# Patient Record
Sex: FEMALE
[De-identification: names, ages, dates, MRNs, and addresses within clinical notes are randomized per-mention and may not be internally consistent; named-entity substitution may affect disease eponyms.]

---

## 2017-04-14 ENCOUNTER — HOSPITAL ENCOUNTER (EMERGENCY)
Dept: HOSPITAL 31 - C.ER | Age: 32
Discharge: HOME | End: 2017-04-14
Payer: SELF-PAY

## 2017-04-14 VITALS — RESPIRATION RATE: 18 BRPM | SYSTOLIC BLOOD PRESSURE: 110 MMHG | DIASTOLIC BLOOD PRESSURE: 70 MMHG | HEART RATE: 92 BPM

## 2017-04-14 VITALS — TEMPERATURE: 98.9 F

## 2017-04-14 VITALS — BODY MASS INDEX: 35 KG/M2

## 2017-04-14 VITALS — OXYGEN SATURATION: 100 %

## 2017-04-14 DIAGNOSIS — N76.4: Primary | ICD-10-CM

## 2017-04-14 NOTE — C.PDOC
History Of Present Illness


33 y/o female presents to the ED with complaints of right labial pain and 

swelling x4 days. Pt reports history of multiple bartholin cysts that required I

&D. Pt states pain has become increasingly worse and is difficult to walk. She 

denies fever, trauma, dysuria/hematruia, vaginal bleeding/discharge.


Time Seen by Provider: 04/14/17 15:48


Chief Complaint (Nursing): Female Genitourinary


History Per: Patient


History/Exam Limitations: no limitations


Onset/Duration Of Symptoms: Days


Current Symptoms Are (Timing): Worse


Severity: Moderate


Quality Of Discomfort: "Pain"


Associated Symptoms: denies: Fever, Chills


Alleviating Factors: None


Abnormal Vaginal Bleeding: No





Past Medical History


Reviewed: Historical Data, Nursing Documentation, Vital Signs


Vital Signs: 


 Last Vital Signs











Temp  98.9 F   04/14/17 15:36


 


Pulse  92 H  04/14/17 16:53


 


Resp  18   04/14/17 16:53


 


BP  110/70   04/14/17 16:53


 


Pulse Ox  100   04/14/17 17:19














- Medical History


PMH: HTN, Kidney Stones, Chronic Kidney Disease





- [facility] Procedures








INCIS VULVA/PERINEUM NEC (07/03/14)


INCISE BARTHOLIN'S GLAND (05/27/15)








Family History: States: No Known Family Hx





- Social History


Hx Tobacco Use: No


Hx Alcohol Use: No


Hx Substance Use: No





- Immunization History


Hx Tetanus Toxoid Vaccination: No


Hx Influenza Vaccination: No


Hx Pneumococcal Vaccination: No





Review Of Systems


Except As Marked, All Systems Reviewed And Found Negative.


Constitutional: Negative for: Fever, Chills


Cardiovascular: Negative for: Chest Pain, Palpitations


Respiratory: Negative for: Cough, Shortness of Breath


Gastrointestinal: Negative for: Nausea, Vomiting, Abdominal Pain, Diarrhea


Genitourinary: Positive for: Other (right labial pain and swelling ).  Negative 

for: Dysuria, Hematuria, Vaginal Discharge, Vaginal Bleeding





Physical Exam





- Physical Exam


Appears: Non-toxic, In Acute Distress (mild to moderate)


Skin: Warm, Dry, No Rash


Oral Mucosa: Moist


Cardiovascular: Rhythm Regular


Respiratory: Normal Breath Sounds, No Rales, No Rhonchi, No Wheezing


Gastrointestinal/Abdominal: Normal Exam, Bowel Sounds, Soft, No Tenderness


Pelvic: No Vaginal Bleeding, No Vaginal Discharge, Other (superior aspect of 

right labia - 6 cm area of induration, 2 cm area of fluctuance with central 

pustule, exquisitely tender; no bartholins cyst; no vesicular lesions)


Extremity: Bilateral: Atraumatic


Neurological/Psych: Oriented x3





ED Course And Treatment


O2 Sat by Pulse Oximetry: 100 (on room air)


Pulse Ox Interpretation: Normal


Progress Note: Patient given PO Vicodin and Clindamycin.  I&D done by me, see 

note. Patient tolerated procedure with some difficulty.  Instructed patient to 

return in 48 hours for packing removal and wound check.





- Incision & Drainage Of Abscess


Anesthesia: Lidocaine 1%


Prep Used: Betadine


Procedure: Incised W/Scalpel Blade#: (11), Drained Pus (~1-2mL expressed), 

Packed W/Gauze (1/4 inch iodoform; covered with gauze and dressing), Cultures 

Obtained And Sent To Lab





Disposition


Counseled Patient/Family Regarding: Studies Performed, Diagnosis, Need For 

Followup, Rx Given





- Disposition


Referrals: 


Elio Feliz MD [Staff Provider] - 


Disposition: HOME/ ROUTINE


Disposition Time: 16:50


Condition: STABLE


Additional Instructions: 


DEVUELVA A LA ROBERTO DE EMERGENCIA EN 48 HORAS PARA LA ELIMINACIN DE LAS HERIDAS 

/ EMBALAJE





USE MEDICAMENTOS SEGN LO DIRIGIDO








RETURN TO EMERGENCY ROOM IN 48 HOURS FOR WOUND CHECK/PACKING REMOVAL





USE MEDICATIONS AS DIRECTED


Prescriptions: 


Clindamycin [Cleocin] 300 mg PO TID #21 cap


Hydrocodone/Acetaminophen [Hydrocodon-Acetaminophen 5-325] 1 each PO Q6 PRN #12 

tablet


 PRN Reason: Pain, Moderate (4-7)


Lactobacillus Combination No.4 [Probiotic] 1 each PO DAILY #7 capsule


Instructions:  Abscess (ED)


Print Language: Cymro





- POA


Present On Arrival: None





- Clinical Impression


Clinical Impression: 


 Abscess of labia





- Scribe Statement


The provider has reviewed the documentation as recorded by the Nimisha Pritchett


Provider Attestation: 





All medical record entries made by the Nimisha were at my direction and 

personally dictated by me. I have reviewed the chart and agree that the record 

accurately reflects my personal performance of the history, physical exam, 

medical decision making, and the department course for this patient. I have 

also personally directed, reviewed, and agree with the discharge instructions 

and disposition.

## 2017-04-22 ENCOUNTER — HOSPITAL ENCOUNTER (EMERGENCY)
Dept: HOSPITAL 31 - C.ER | Age: 32
Discharge: HOME | End: 2017-04-22
Payer: SELF-PAY

## 2017-04-22 VITALS
DIASTOLIC BLOOD PRESSURE: 72 MMHG | SYSTOLIC BLOOD PRESSURE: 124 MMHG | OXYGEN SATURATION: 100 % | HEART RATE: 89 BPM | TEMPERATURE: 98.5 F

## 2017-04-22 VITALS — BODY MASS INDEX: 35 KG/M2

## 2017-04-22 VITALS — RESPIRATION RATE: 20 BRPM
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
## 2017-04-22 DIAGNOSIS — S70.362A: Primary | ICD-10-CM

## 2017-04-22 NOTE — C.PDOC
History Of Present Illness


The patient, a 31 y/o female, presents to the ED for evaluation of a pimple to 

her left inner thigh which she noted around 3 days ago. Patient states the 

pimple appears to be getting bigger and is now more painful. Patient denies 

fever, chills, or known allergens. 


Time Seen by Provider: 04/22/17 21:19


Chief Complaint (Nursing): Abnormal Skin Integrity


History Per: Patient


History/Exam Limitations: no limitations


Onset/Duration Of Symptoms: Days (3)


Current Symptoms Are (Timing): Still Present


Location Of Injury: Left: Leg (inner thigh)


Quality Of Symptoms: Painful


Additional History Per: Patient





Past Medical History


Reviewed: Historical Data, Nursing Documentation, Vital Signs


Vital Signs: 


 Last Vital Signs











Temp  98.5 F   04/22/17 21:05


 


Pulse  89   04/22/17 21:05


 


Resp  20   04/22/17 21:58


 


BP  124/72   04/22/17 21:05


 


Pulse Ox  100   04/23/17 04:00














- Medical History


PMH: HTN, Kidney Stones, Chronic Kidney Disease


Surgical History: No Surg Hx





- [facility] Procedures








INCIS VULVA/PERINEUM NEC (07/03/14)


INCISE BARTHOLIN'S GLAND (05/27/15)








Family History: States: Unknown Family Hx





- Social History


Hx Tobacco Use: No


Hx Alcohol Use: No


Hx Substance Use: No





- Immunization History


Hx Tetanus Toxoid Vaccination: No


Hx Influenza Vaccination: Yes


Hx Pneumococcal Vaccination: No





Review Of Systems


Except As Marked, All Systems Reviewed And Found Negative.


Constitutional: Negative for: Fever, Chills


Skin: Positive for: Other (+painful pimple to left inner thigh )





Physical Exam





- Physical Exam


Appears: Non-toxic, No Acute Distress


Skin: Normal Color, Warm, Dry, Other (left upper inner thigh: 3x4 area of 

localized erythema with small papule at the center. no swelling, warmth, or 

purulent mass )


Head: Atraumatic, Normacephalic


Eye(s): bilateral: Normal Inspection


Oral Mucosa: Moist


Neck: Supple


Extremity: Normal ROM, No Tenderness, No Calf Tenderness, Capillary Refill (

less than 2 seconds ), No Deformity, No Swelling


Neurological/Psych: Oriented x3, Normal Speech, Normal Cognition


Gait: Steady





ED Course And Treatment


O2 Sat by Pulse Oximetry: 100 (on RA)


Pulse Ox Interpretation: Normal


Progress Note: On reassessment, patient is resting comfortably, showing no 

signs of distress, and is stable for discharge. Patient is advised to follow up 

with her PMD within a timely manner for further evaluation.





Disposition


Counseled Patient/Family Regarding: Diagnosis, Need For Followup





- Disposition


Referrals: 


[facility] [Outside]


Disposition: HOME/ ROUTINE


Disposition Time: 21:45


Condition: STABLE


Additional Instructions: 


Take meds as directed 





Follow up with PMD 





Return to ER if worse


Prescriptions: 


Sulfamethoxazole/Trimethoprim [Bactrim Ds Tablet] 1 each PO BID #14 tablet


DiphenhydrAMINE [Benadryl] 25 mg PO QID #20 cap


Ibuprofen [Motrin] 600 mg PO Q6H #30 tab


Instructions:  Insect Bite or Sting (ED)


Print Language: Ukrainian





- Clinical Impression


Clinical Impression: 


 Insect bite





- PA / NP / Resident Statement


MD/DO has reviewed & agrees with the documentation as recorded.





- Scribe Statement


The provider has reviewed the documentation as recorded by the Scribe (Lesa Rodriguez)





All medical record entries made by the Scribe were at my direction and 

personally dictated by me. I have reviewed the chart and agree that the record 

accurately reflects my personal performance of the history, physical exam, 

medical decision making, and the department course for this patient. I have 

also personally directed, reviewed, and agree with the discharge instructions 

and disposition.

## 2017-09-15 ENCOUNTER — HOSPITAL ENCOUNTER (EMERGENCY)
Dept: HOSPITAL 31 - C.ER | Age: 32
Discharge: HOME | End: 2017-09-15
Payer: SELF-PAY

## 2017-09-15 VITALS
TEMPERATURE: 98.2 F | HEART RATE: 72 BPM | OXYGEN SATURATION: 100 % | SYSTOLIC BLOOD PRESSURE: 110 MMHG | RESPIRATION RATE: 16 BRPM | DIASTOLIC BLOOD PRESSURE: 69 MMHG

## 2017-09-15 VITALS — BODY MASS INDEX: 32.9 KG/M2

## 2017-09-15 DIAGNOSIS — L02.412: Primary | ICD-10-CM

## 2017-09-15 NOTE — C.PDOC
History Of Present Illness


The patient is a 33yo female, presents to the ED for evaluation of swelling and 

painful lump to her left axilla, present for the past 3 months. She reports a 

history of abscesses in the past but states she has not had one in this area. 

She reports associated tactile fever but offers no other medical complaints.


Time Seen by Provider: 09/15/17 19:18


Chief Complaint (Nursing): Abnormal Skin Integrity


History Per: Patient


History/Exam Limitations: no limitations


Onset/Duration Of Symptoms: Days (3)


Current Symptoms Are (Timing): Still Present


Quality Of Symptoms: Swollen


Additional History Per: Patient





Past Medical History


Reviewed: Historical Data, Nursing Documentation, Vital Signs


Vital Signs: 


 Last Vital Signs











Temp  98.2 F   09/15/17 18:32


 


Pulse  72   09/15/17 18:32


 


Resp  16   09/15/17 18:32


 


BP  110/69   09/15/17 18:32


 


Pulse Ox  100   09/15/17 21:28














- Medical History


PMH: HTN, Kidney Stones, Chronic Kidney Disease


Surgical History: 





- [facility] Procedures








INCIS VULVA/PERINEUM NEC (14)


INCISE BARTHOLIN'S GLAND (05/27/15)








Family History: States: No Known Family Hx, Unknown Family Hx





- Social History


Hx Tobacco Use: No


Hx Alcohol Use: No


Hx Substance Use: No





- Immunization History


Hx Tetanus Toxoid Vaccination: Yes


Hx Influenza Vaccination: Yes


Hx Pneumococcal Vaccination: Yes





Review Of Systems


Constitutional: Positive for: Fever (tactile)


Musculoskeletal: Positive for: Other (swollen and painful lump present on left 

axilla)





Physical Exam





- Physical Exam


Appears: Non-toxic, No Acute Distress


Skin: Warm, Dry, No Rash


Head: Atraumatic, Normacephalic


Eye(s): bilateral: Normal Inspection


Oral Mucosa: Moist


Neck: Normal, Supple


Cardiovascular: Rhythm Regular, No Friction Rub, No Murmur


Respiratory: Normal Breath Sounds, No Decreased Breath Sounds, No Accessory 

Muscle Use


Extremity: Normal ROM, Swelling (2x2cm area of swelling, redness and fluctuance 

present on left axilla.)


Neurological/Psych: Oriented x3, Normal Speech, Normal Cognition, Normal Motor, 

Normal Sensation


Gait: Steady





ED Course And Treatment


O2 Sat by Pulse Oximetry: 100 (RA)


Pulse Ox Interpretation: Normal


Progress Note: Doxycycline and Motrin ordered.





- Incision & Drainage Of Abscess


Anesthesia: Lidocaine 1%, With Epi


Prep Used: Sterile Water, Betadine


Procedure: Incised W/Scalpel Blade#: (11), Drained Pus, Probed To Break Up 

Loculations, Packed W/Gauze, Cultures Obtained And Sent To Lab





Disposition





- Disposition


Referrals: 


[facility] [Outside]


Disposition: HOME/ ROUTINE


Disposition Time: 20:25


Condition: GOOD


Additional Instructions: 


Follow up with the medical doctor within 1-2 days. Return if worsened. 


Prescriptions: 


Doxycycline Hyclate [Doryx] 100 mg PO BID #19 cap


Ibuprofen [Motrin Tab] 800 mg PO TID #20 tab


Instructions:  Abscess (ED)


Forms:  [facility] (English)


Print Language: Portuguese





- Clinical Impression


Clinical Impression: 


 Abscess








- PA / NP / Resident Statement


MD/DO has reviewed & agrees with the documentation as recorded.





- Scribe Statement


The provider has reviewed the documentation as recorded by the Scribe


Shaye Triana





All medical record entries made by the Scribe were at my direction and 

personally dictated by me. I have reviewed the chart and agree that the record 

accurately reflects my personal performance of the history, physical exam, 

medical decision making, and the department course for this patient. I have 

also personally directed, reviewed, and agree with the discharge instructions 

and disposition.

## 2017-11-10 ENCOUNTER — HOSPITAL ENCOUNTER (EMERGENCY)
Dept: HOSPITAL 31 - C.ER | Age: 32
Discharge: HOME | End: 2017-11-10
Payer: COMMERCIAL

## 2017-11-10 VITALS — DIASTOLIC BLOOD PRESSURE: 68 MMHG | TEMPERATURE: 98.1 F | HEART RATE: 69 BPM | SYSTOLIC BLOOD PRESSURE: 119 MMHG

## 2017-11-10 VITALS — OXYGEN SATURATION: 98 %

## 2017-11-10 VITALS — RESPIRATION RATE: 18 BRPM

## 2017-11-10 VITALS — BODY MASS INDEX: 32.9 KG/M2

## 2017-11-10 DIAGNOSIS — N75.1: Primary | ICD-10-CM

## 2017-11-10 NOTE — C.PDOC
History Of Present Illness


33 yo female c/o left sided vaginal swelling for 3 day. Notes h/o bartholins 

cysts with multiple I&Ds. Pain worse with movement. Notes she tried draining it 

herself without releif. No fever, abdominal pain, vaginal discharge, trauma, 

dysuria/hematruia. Pt is currently menstruating. 


Time Seen by Provider: 11/10/17 16:38


Chief Complaint (Nursing): Abnormal Skin Integrity


History Per: Patient, 


History/Exam Limitations: no limitations


Onset/Duration Of Symptoms: Days


Current Symptoms Are (Timing): Still Present





Past Medical History


Vital Signs: 


 Last Vital Signs











Temp  98.1 F   11/10/17 18:03


 


Pulse  69   11/10/17 18:03


 


Resp  18   11/10/17 18:03


 


BP  119/68   11/10/17 18:03


 


Pulse Ox  98   11/10/17 20:33














- Medical History


PMH: HTN, Kidney Stones, Chronic Kidney Disease


Surgical History: 





- [facility] Procedures








INCIS VULVA/PERINEUM NEC (14)


INCISE BARTHOLIN'S GLAND (05/27/15)








Family History: States: Unknown Family Hx





- Social History


Hx Tobacco Use: No


Hx Alcohol Use: No


Hx Substance Use: No





- Immunization History


Hx Tetanus Toxoid Vaccination: Yes


Hx Influenza Vaccination: Yes


Hx Pneumococcal Vaccination: Yes





Review Of Systems


Constitutional: Negative for: Fever


Genitourinary: Negative for: Dysuria, Vaginal Discharge, Pelvic Pain





Physical Exam





- Physical Exam


Appears: Well, Non-toxic, No Acute Distress


Skin: Normal Color, Warm, Dry


Head: Atraumatic, Normacephalic


Eye(s): bilateral: Normal Inspection, EOMI


Nose: Normal


Throat: Normal


Neck: Normal


Chest: Symmetrical


Respiratory: No Accessory Muscle Use


Gastrointestinal/Abdominal: Normal Exam, Soft, No Tenderness


Back: Normal Inspection


Pelvic: Other ((+) tenderness, swelling, fluctuance and minimal active 

discharge to left inferior labia)


Extremity: Normal ROM


Neurological/Psych: Oriented x3, Normal Speech





ED Course And Treatment


O2 Sat by Pulse Oximetry: 98


Progress Note: Pt tolerated procedure.   used to ensure 

understanding.  Discussed wound care and instructed GYN follow up with PM Din 1-

2 days.





- Incision & Drainage Of Abscess


Anesthesia: Lidocaine 2%


Prep Used: Sterile Water, Betadine


Procedure: Incised W/Scalpel Blade#: (11), Drained Pus (copious amount of 

drainage ), Irrigated Cavity W/Saline, Probed To Break Up Loculations, Packed W/

Gauze





Disposition





- Disposition


Referrals: 


[facility] [Outside]


Disposition: HOME/ ROUTINE


Disposition Time: 17:46


Condition: STABLE


Additional Instructions: 


Return in 2 days for wound check. 


Regrese en 2 colón para el control de la herida. [place] los medicamentos jm 

indicado. Volver a la james de emergencia en cualquier momento si los sntomas 

persisten o empeoran.


Prescriptions: 


Clindamycin [Cleocin] 300 mg PO Q6 #28 cap


oxyCODONE/Acetaminophen [Percocet 5/325 mg Tab] 1 tab PO QID PRN #20 tab


 PRN Reason: Pain


Instructions:  Bartholin Cyst (ED)


Forms:  [facility] (English)





- Clinical Impression


Clinical Impression: 


 Abscess of Bartholin's gland, Incisional abscess

## 2018-03-13 ENCOUNTER — HOSPITAL ENCOUNTER (EMERGENCY)
Dept: HOSPITAL 31 - C.ER | Age: 33
Discharge: HOME | End: 2018-03-13
Payer: COMMERCIAL

## 2018-03-13 VITALS
RESPIRATION RATE: 18 BRPM | SYSTOLIC BLOOD PRESSURE: 107 MMHG | DIASTOLIC BLOOD PRESSURE: 74 MMHG | TEMPERATURE: 98.3 F | HEART RATE: 87 BPM | OXYGEN SATURATION: 100 %

## 2018-03-13 VITALS — BODY MASS INDEX: 32.9 KG/M2

## 2018-03-13 DIAGNOSIS — I12.9: ICD-10-CM

## 2018-03-13 DIAGNOSIS — M89.8X9: ICD-10-CM

## 2018-03-13 DIAGNOSIS — R51: Primary | ICD-10-CM

## 2018-03-13 DIAGNOSIS — N18.9: ICD-10-CM

## 2018-03-13 NOTE — C.PDOC
History Of Present Illness


33 year old female presents to the ED complaining of a left-sided headache 

associated with nasal congestion for 2 days. Patient reports history of the 

similar episodes of headaches in the past. Also complaining of a painless bony 

prominence at her forehead, which she states has been there for years. Patient 

states she was referred here to have surgical removal of this bony prominence 

at our hospital. Has not taken any medications for symptom relief. 





Time Seen by Provider: 18 16:35


Chief Complaint (Nursing): Headache


History Per: Patient


History/Exam Limitations: no limitations


Onset/Duration Of Symptoms: Intermittent Episodes


Current Symptoms Are (Timing): Still Present





Past Medical History


Reviewed: Historical Data, Nursing Documentation, Vital Signs


Vital Signs: 


 Last Vital Signs











Temp  98.3 F   18 16:28


 


Pulse  87   18 16:28


 


Resp  18   18 16:28


 


BP  107/74   18 16:28


 


Pulse Ox  100   18 17:05














- Medical History


PMH: HTN, Kidney Stones, Chronic Kidney Disease


Surgical History: 





- [facility] Procedures








INCIS VULVA/PERINEUM NEC (14)


INCISE BARTHOLIN'S GLAND (05/27/15)








Family History: States: Unknown Family Hx





- Social History


Hx Tobacco Use: No


Hx Alcohol Use: No


Hx Substance Use: No





- Immunization History


Hx Tetanus Toxoid Vaccination: Yes


Hx Influenza Vaccination: Yes


Hx Pneumococcal Vaccination: Yes





Review Of Systems


Except As Marked, All Systems Reviewed And Found Negative.


Eyes: Negative for: Vision Change


ENT: Positive for: Nose Congestion


Neurological: Positive for: Headache (with bony prominence to forehead).  

Negative for: Weakness, Numbness, Incoordination, Change in Speech





Physical Exam





- Physical Exam


Appears: Non-toxic, No Acute Distress


Skin: Normal Color, Warm, Dry


Head: Atraumatic, Normacephalic, Other (1x1 superficial bony prominence at 

right center of forehead)


Eye(s): bilateral: Normal Inspection, PERRL, EOMI


Ear(s): Bilateral: Normal


Nose: Other (Nasal passages inflamed bilaterally, turbinates touching on the 

left side)


Oral Mucosa: Moist


Neck: Normal ROM, Supple


Chest: Symmetrical


Cardiovascular: Rhythm Regular, No Murmur


Respiratory: Normal Breath Sounds, No Accessory Muscle Use


Neurological/Psych: Oriented x3, Normal Speech, Normal Cranial Nerves, Normal 

Motor, Normal Sensation


Gait: Steady





ED Course And Treatment


O2 Sat by Pulse Oximetry: 100 (RA)


Pulse Ox Interpretation: Normal





Medical Decision Making


Medical Decision Making: 





Impression:


small superficial bony painless SQ are under L frontal area 1x1 cm.  Pt claims 

this gives her headaches chronically and is pending elective surgical resection 

but no CT of head/face noted here, no notes mentioning this forehead finding.


bony prominence is painless and LOW susp of causing chronic headaches, where 

sinus headaches much more likely in this case. 





May be referred for oupatient MRI from Clinic for further eval of seemingly 

benign forehead bony prominence.





Disposition


Doctor Will See Patient In The: Office


Counseled Patient/Family Regarding: Studies Performed, Diagnosis





- Disposition


Referrals: 


[facility] [Outside]


[email or web address] [Outside]


Disposition: HOME/ ROUTINE


Disposition Time: 17:02


Condition: GOOD


Additional Instructions: 


sigue con DAyquil o' Nyquil "COLD AND SINUS" (o' el equivalente) que contiene 

el ingrediente para descongestionar las pasajes nasales





[place] Flonase (esteroids por la nariz) que baja inflammaci'n nasal tambien.





Sigue en la [facility] para considerar un MRI del area frontal 

de la neal para evaluar betsy prominencia del hueso frontal.


Prescriptions: 


Fluticasone Nasal [Flonase] 1 actuation NS DAILY #1 spr


Instructions:  Sinus Headache (DC)


Forms:  [facility] (English)


Print Language: Amharic





- POA


Present On Arrival: None





- Clinical Impression


Clinical Impression: 


 Sinus headache, Bony prominence








- Scribe Statement


The provider has reviewed the documentation as recorded by the Scribe


Ina Mclean





Provider Attestation: 





All medical record entries made by the Scribe were at my direction and 

personally dictated by me. I have reviewed the chart and agree that the record 

accurately reflects my personal performance of the history, physical exam, 

medical decision making, and the department course for this patient. I have 

also personally directed, reviewed, and agree with the discharge instructions 

and disposition.

## 2018-03-20 ENCOUNTER — HOSPITAL ENCOUNTER (EMERGENCY)
Dept: HOSPITAL 31 - C.ER | Age: 33
Discharge: HOME | End: 2018-03-20
Payer: COMMERCIAL

## 2018-03-20 VITALS — RESPIRATION RATE: 18 BRPM | HEART RATE: 76 BPM | DIASTOLIC BLOOD PRESSURE: 68 MMHG | SYSTOLIC BLOOD PRESSURE: 105 MMHG

## 2018-03-20 VITALS — OXYGEN SATURATION: 100 %

## 2018-03-20 VITALS — BODY MASS INDEX: 30.2 KG/M2

## 2018-03-20 VITALS — TEMPERATURE: 98.7 F

## 2018-03-20 DIAGNOSIS — L02.214: Primary | ICD-10-CM

## 2018-03-20 NOTE — C.PDOC
History Of Present Illness


32yo female, presents to ED with complaints of pain and swelling to her right 

groin area for the past 4 days. Patient states 6 days ago, she had waxing to 

the area and for the past 4 days, she has noticed painful swelling to that 

area. She reports a history of multiple abscesses with incision and drainage in 

the past in various locations. She currently denies any fever or chills. 


Time Seen by Provider: 18 16:08


Chief Complaint (Nursing): Abnormal Skin Integrity


History Per: Patient


History/Exam Limitations: no limitations


Onset/Duration Of Symptoms: Days


Current Symptoms Are (Timing): Still Present


Quality Of Symptoms: Painful, Swollen


Additional History Per: Patient





Past Medical History


Reviewed: Historical Data, Nursing Documentation, Vital Signs


Vital Signs: 


 Last Vital Signs











Temp  98.7 F   18 15:56


 


Pulse  76   18 17:04


 


Resp  18   18 17:04


 


BP  105/68   18 17:04


 


Pulse Ox  100   18 18:13














- Medical History


PMH: HTN, Kidney Stones, Chronic Kidney Disease


Surgical History: 





- [facility] Procedures








INCIS VULVA/PERINEUM NEC (14)


INCISE BARTHOLIN'S GLAND (05/27/15)








Family History: States: Unknown Family Hx





- Social History


Hx Tobacco Use: No


Hx Alcohol Use: No


Hx Substance Use: No





- Immunization History


Hx Tetanus Toxoid Vaccination: Yes


Hx Influenza Vaccination: Yes


Hx Pneumococcal Vaccination: Yes





Review Of Systems


Except As Marked, All Systems Reviewed And Found Negative.


Constitutional: Negative for: Fever, Chills


Skin: Positive for: Other (painful swelling to right groin region)





Physical Exam





- Physical Exam


Appears: Non-toxic, No Acute Distress


Skin: Warm, Dry


Head: Atraumatic, Normacephalic


Eye(s): bilateral: Normal Inspection, EOMI


Nose: Normal


Oral Mucosa: Moist


Neck: Normal ROM, Supple


Chest: Symmetrical


Respiratory: No Accessory Muscle Use


Gastrointestinal/Abdominal: Soft, No Tenderness


Pelvic: Other (mons pubis: 1cm area of swelling, tenderness and erythema with 

central fluctuance to right side)


Extremity: Normal ROM, Other


Neurological/Psych: Oriented x3





ED Course And Treatment


O2 Sat by Pulse Oximetry: 100 (RA)


Pulse Ox Interpretation: Normal


Progress Note: Incision and drainage performed by provider and wound packing 

was placed. Patient given instructions for wound care and instructed to follow 

up without fail.  used to ensure understanding.





- Incision & Drainage Of Abscess


Anesthesia: Lidocaine 1%


Used During Procedure: Continuous Pulse Oximetry


Prep Used: Sterile Water, Betadine


Procedure: Incised W/Scalpel Blade#: (11), Drained Pus, Irrigated Cavity W/

Saline, Probed To Break Up Loculations, Packed W/Gauze





Disposition





- Disposition


Disposition: HOME/ ROUTINE


Disposition Time: 16:57


Condition: STABLE


Additional Instructions: 


Aplique compresa caliente. [place] de heridas en 2 colón. Vaya a schroeder mdico o la 

clnica en 2-5 colón sin falta, para mas evaluacin. [place] los medicamentos jm 

indicado. Volver a la james de emergencia en cualquier momento si los sntomas 

persisten o empeoran.


Prescriptions: 


Clindamycin [Cleocin] 300 mg PO Q6 #28 cap


Instructions:  Abscess Incision and Drainage


Forms:  [facility] (English)


Print Language: Nepali





- Clinical Impression


Clinical Impression: 


 Incisional abscess








- PA / NP / Resident Statement


MD/DO has reviewed & agrees with the documentation as recorded.





- Scribe Statement


The provider has reviewed the documentation as recorded by the Scribe (Shaye Triana)


Provider Attestation:


All medical record entries made by the Scribe were at my direction and 

personally dictated by me. I have reviewed the chart and agree that the record 

accurately reflects my personal performance of the history, physical exam, 

medical decision making, and the department course for this patient. I have 

also personally directed, reviewed, and agree with the discharge instructions 

and disposition.

## 2018-04-19 ENCOUNTER — HOSPITAL ENCOUNTER (EMERGENCY)
Dept: HOSPITAL 31 - C.ER | Age: 33
Discharge: HOME | End: 2018-04-19
Payer: COMMERCIAL

## 2018-04-19 VITALS
TEMPERATURE: 97.9 F | HEART RATE: 74 BPM | DIASTOLIC BLOOD PRESSURE: 71 MMHG | RESPIRATION RATE: 20 BRPM | SYSTOLIC BLOOD PRESSURE: 130 MMHG

## 2018-04-19 VITALS — OXYGEN SATURATION: 100 %

## 2018-04-19 VITALS — BODY MASS INDEX: 30.2 KG/M2

## 2018-04-19 DIAGNOSIS — N92.6: Primary | ICD-10-CM

## 2018-04-19 DIAGNOSIS — I12.9: ICD-10-CM

## 2018-04-19 DIAGNOSIS — N18.9: ICD-10-CM

## 2018-04-19 LAB
ALBUMIN SERPL-MCNC: 4.4 G/DL (ref 3.5–5)
ALBUMIN/GLOB SERPL: 1.4 {RATIO} (ref 1–2.1)
ALT SERPL-CCNC: 17 U/L (ref 9–52)
AST SERPL-CCNC: 17 U/L (ref 14–36)
BASOPHILS # BLD AUTO: 0.1 K/UL (ref 0–0.2)
BASOPHILS NFR BLD: 1.1 % (ref 0–2)
BUN SERPL-MCNC: 10 MG/DL (ref 7–17)
CALCIUM SERPL-MCNC: 9.3 MG/DL (ref 8.6–10.4)
EOSINOPHIL # BLD AUTO: 0.2 K/UL (ref 0–0.7)
EOSINOPHIL NFR BLD: 2.2 % (ref 0–4)
ERYTHROCYTE [DISTWIDTH] IN BLOOD BY AUTOMATED COUNT: 14.1 % (ref 11.5–14.5)
GFR NON-AFRICAN AMERICAN: > 60
HGB BLD-MCNC: 12.1 G/DL (ref 11–16)
LYMPHOCYTES # BLD AUTO: 2.8 K/UL (ref 1–4.3)
LYMPHOCYTES NFR BLD AUTO: 33.8 % (ref 20–40)
MCH RBC QN AUTO: 26.9 PG (ref 27–31)
MCHC RBC AUTO-ENTMCNC: 32.5 G/DL (ref 33–37)
MCV RBC AUTO: 82.6 FL (ref 81–99)
MONOCYTES # BLD: 0.5 K/UL (ref 0–0.8)
MONOCYTES NFR BLD: 6.1 % (ref 0–10)
NEUTROPHILS # BLD: 4.8 K/UL (ref 1.8–7)
NEUTROPHILS NFR BLD AUTO: 56.8 % (ref 50–75)
NRBC BLD AUTO-RTO: 0.1 % (ref 0–2)
PLATELET # BLD: 315 K/UL (ref 130–400)
PMV BLD AUTO: 8.4 FL (ref 7.2–11.7)
RBC # BLD AUTO: 4.52 MIL/UL (ref 3.8–5.2)
WBC # BLD AUTO: 8.4 K/UL (ref 4.8–10.8)

## 2018-04-19 PROCEDURE — 99283 EMERGENCY DEPT VISIT LOW MDM: CPT

## 2018-04-19 PROCEDURE — 84702 CHORIONIC GONADOTROPIN TEST: CPT

## 2018-04-19 PROCEDURE — 96360 HYDRATION IV INFUSION INIT: CPT

## 2018-04-19 PROCEDURE — 85025 COMPLETE CBC W/AUTO DIFF WBC: CPT

## 2018-04-19 PROCEDURE — 86850 RBC ANTIBODY SCREEN: CPT

## 2018-04-19 PROCEDURE — 80053 COMPREHEN METABOLIC PANEL: CPT

## 2018-04-19 PROCEDURE — 86900 BLOOD TYPING SEROLOGIC ABO: CPT

## 2018-04-19 NOTE — C.PDOC
History Of Present Illness


32 y/o female presents to ED with c/o nausea, vomiting and lightheadedness for 

1 week associated with pelvic cramping pain. Patient states her last menses was 

on 3/20/18 and has been intermittently spotting since. Patient has not taken 

pregnancy test and denies fever, dysruia or any other complaints at this time.


Time Seen by Provider: 18 16:09


Chief Complaint (Nursing): Abdominal Pain


History Per: Patient


History/Exam Limitations: no limitations


Onset/Duration Of Symptoms: Days


Current Symptoms Are (Timing): Still Present


Quality Of Discomfort: Cramping





Past Medical History


Reviewed: Historical Data, Nursing Documentation, Vital Signs


Vital Signs: 


 Last Vital Signs











Temp  98.0 F   18 15:43


 


Pulse  80   18 15:43


 


Resp  19   18 15:43


 


BP  136/78   18 15:43


 


Pulse Ox  100   18 17:30














- Medical History


PMH: HTN, Kidney Stones, Chronic Kidney Disease


Surgical History: 





- [facility] Procedures








INCIS VULVA/PERINEUM NEC (14)


INCISE BARTHOLIN'S GLAND (05/27/15)








Family History: States: No Known Family Hx





- Social History


Hx Tobacco Use: No


Hx Alcohol Use: No


Hx Substance Use: No





- Immunization History


Hx Tetanus Toxoid Vaccination: Yes


Hx Influenza Vaccination: Yes


Hx Pneumococcal Vaccination: No





Review Of Systems


Except As Marked, All Systems Reviewed And Found Negative.


Constitutional: Negative for: Fever, Chills


Cardiovascular: Positive for: Light Headedness


Gastrointestinal: Positive for: Nausea, Vomiting


Genitourinary: Positive for: Vaginal Bleeding, Pelvic Pain.  Negative for: 

Dysuria


Skin: Negative for: Rash





Physical Exam





- Physical Exam


Appears: Non-toxic, No Acute Distress


Skin: Warm, Dry, No Rash


Head: Atraumatic, Normacephalic


Eye(s): bilateral: Normal Inspection


Oral Mucosa: Moist


Neck: Normal ROM, Supple


Cardiovascular: Rhythm Regular


Respiratory: Normal Breath Sounds, No Rales, No Rhonchi, No Wheezing


Gastrointestinal/Abdominal: Soft, No Tenderness, No Guarding, No Rebound


Back: No CVA Tenderness


Extremity: Normal ROM, Capillary Refill (<2 seconds)


Neurological/Psych: Oriented x3, Normal Speech, Normal Cognition





ED Course And Treatment





- Laboratory Results


Result Diagrams: 


 18 16:40





 18 16:40


O2 Sat by Pulse Oximetry: 100 (RA)


Pulse Ox Interpretation: Normal


Progress Note: UA, HCG ordered. IV fluids administered





Disposition


Counseled Patient/Family Regarding: Studies Performed, Diagnosis, Need For 

Followup, Rx Given





- Disposition


Referrals: 


[facility] [Outside]


[facility] [Outside]


[facility] [Outside]


Disposition: HOME/ ROUTINE


Disposition Time: 16:40


Condition: STABLE


Additional Instructions: 


FOLLOW UP WITH OB/GYN WITHIN 1 WEEK





RETURN TO EMERGENCY ROOM IF SYMPTOMS WORSEN  








SEGUIMIENTO CON OB / GYN DENTRO DE 1 SEMANA





REGRESE AL ROBERTO DE EMERGENCIA SI LOS SNTOMAS EMPEORAN


Instructions:  Absent or Irregular Periods


Forms:  [employer] (English)


Print Language: Icelandic





- POA


Present On Arrival: None





- Clinical Impression


Clinical Impression: 


 Irregular menses








- Scribe Statement


The provider has reviewed the documentation as recorded by the Scribsarita Stewart 





All medical record entries made by the Chapinibsarita were at my direction and 

personally dictated by me. I have reviewed the chart and agree that the record 

accurately reflects my personal performance of the history, physical exam, 

medical decision making, and the department course for this patient. I have 

also personally directed, reviewed, and agree with the discharge instructions 

and disposition.

## 2018-09-12 ENCOUNTER — HOSPITAL ENCOUNTER (EMERGENCY)
Dept: HOSPITAL 31 - C.ER | Age: 33
Discharge: HOME | End: 2018-09-12
Payer: COMMERCIAL

## 2018-09-12 VITALS — SYSTOLIC BLOOD PRESSURE: 121 MMHG | DIASTOLIC BLOOD PRESSURE: 76 MMHG | HEART RATE: 83 BPM | TEMPERATURE: 98.2 F

## 2018-09-12 VITALS — RESPIRATION RATE: 18 BRPM | OXYGEN SATURATION: 100 %

## 2018-09-12 VITALS — BODY MASS INDEX: 30.2 KG/M2

## 2018-09-12 DIAGNOSIS — M54.5: Primary | ICD-10-CM

## 2018-09-12 LAB
BACTERIA #/AREA URNS HPF: (no result) /[HPF]
BILIRUB UR-MCNC: NEGATIVE MG/DL
GLUCOSE UR STRIP-MCNC: NORMAL MG/DL
HCG,QUALITATIVE URINE: NEGATIVE
LEUKOCYTE ESTERASE UR-ACNC: (no result) LEU/UL
PH UR STRIP: 5 [PH] (ref 5–8)
PROT UR STRIP-MCNC: NEGATIVE MG/DL
RBC # UR STRIP: NEGATIVE /UL
SP GR UR STRIP: 1.02 (ref 1–1.03)
SQUAMOUS EPITHIAL: 31 /HPF (ref 0–5)
UROBILINOGEN UR-MCNC: NORMAL MG/DL (ref 0.2–1)

## 2018-09-12 NOTE — RAD
Date of service: 



09/12/2018



PROCEDURE:  Radiographs of the Lumbar Spine.



HISTORY:

back pain for one month



COMPARISON:

No prior.



FINDINGS:



BONES:

Alignment appears satisfactory. No listhesis. No acute displaced 

fracture identified. Sclerosis of the pubic symphysis may represent 

osteitis pubis. 



DISC SPACES:

Unremarkable.



OTHER FINDINGS:

Moderate constipation.



IMPRESSION:

No acute osseous abnormality is detected. 



Sclerosis of the pubic symphysis may represent osteitis pubis. 



Moderate constipation.

## 2018-09-12 NOTE — C.PDOC
History Of Present Illness


33-year-old female presents to the ED for evaluation of lower back pain which 

began one month ago. Patient describes her pain as dull and aching and states 

it is non-radiating. She notes pain is worse with standing. She has been taking 

Advil with transient relief. Otherwise, patient denies fever, chills, urinary/

bowel incontinence, dysuria, extremity numbness/weakness, or recent trauma/

injuries. 


Time Seen by Provider: 18 12:58


Chief Complaint (Nursing): Back Pain


History Per: Patient


History/Exam Limitations: no limitations


Onset/Duration Of Symptoms: Other (one month )


Current Symptoms Are (Timing): Still Present


Quality Of Discomfort: Dull, Aching, "Pain"


Previous Symptoms: Back Pain (lower)


Associated Symptoms: denies: Incontinence, New Weakness, New Numbness


Exacerbating Factor(s): Movement


Additional History Per: Patient





Past Medical History


Reviewed: Historical Data, Nursing Documentation, Vital Signs


Vital Signs: 


 Last Vital Signs











Temp  98.2 F   18 14:27


 


Pulse  83   18 14:27


 


Resp  18   18 14:27


 


BP  121/76   18 14:27


 


Pulse Ox  100   18 16:13














- Medical History


PMH: HTN, Kidney Stones, Chronic Kidney Disease


Surgical History: 





- [facility] Procedures








INCIS VULVA/PERINEUM NEC (14)


INCISE BARTHOLIN'S GLAND (05/27/15)








Family History: States: Unknown Family Hx





- Social History


Hx Tobacco Use: No


Hx Alcohol Use: No


Hx Substance Use: No





- Immunization History


Hx Tetanus Toxoid Vaccination: Yes


Hx Influenza Vaccination: Yes


Hx Pneumococcal Vaccination: No





Review Of Systems


Constitutional: Negative for: Fever, Chills


Genitourinary: Negative for: Dysuria, Incontinence


Musculoskeletal: Positive for: Back Pain (lower).  Negative for: Leg Pain


Neurological: Negative for: Weakness, Numbness





Physical Exam





- Physical Exam


Appears: Non-toxic, No Acute Distress


Skin: Normal Color, Warm, Dry, No Rash, No Ecchymosis


Head: Atraumatic, Normacephalic


Eye(s): bilateral: Normal Inspection


Oral Mucosa: Moist


Neck: Supple


Chest: Symmetrical, No Deformity, No Tenderness


Cardiovascular: Rhythm Regular


Respiratory: Normal Breath Sounds


Back: No Vertebral Tenderness, No Paraspinal Tenderness (minimal, lumbar )


Extremity: Normal ROM, No Tenderness, Capillary Refill (less than 2 seconds ), 

No Deformity, No Swelling


Neurological/Psych: Oriented x3, Normal Speech, Normal Cognition


Gait: Steady





ED Course And Treatment


O2 Sat by Pulse Oximetry: 100 (on RA)


Pulse Ox Interpretation: Normal





- Other Rad


  ** lumbar spine XR


X-Ray: Viewed By Me, Read By Radiologist


Interpretation: PROCEDURE:  Radiographs of the Lumbar Spine.  HISTORY:  back 

pain for one month.  COMPARISON:  No prior.  FINDINGS:  BONES:  Alignment 

appears satisfactory. No listhesis. No acute displaced fracture identified. 

Sclerosis of the pubic symphysis may represent osteitis pubis.  DISC SPACES:  

Unremarkable.  OTHER FINDINGS:  Moderate constipation.  IMPRESSION:  No acute 

osseous abnormality is detected.  Sclerosis of the pubic symphysis may 

represent osteitis pubis.  Moderate constipation.





Medical Decision Making


Medical Decision Making: 





Impression: 33 year old female with lower back pain 


Plan: 


* LS Spine AP/LAT 


* Urinalysis 





Progress: 


LS Spine AP/LAT and urinalysis ordered and reviewed. Results show no UTI and 

xray shows no osseous deformity


Patient remained afebrile alert and oriented with stable vital signs during ER 

evaluation.


Discussed results with patient and given follow up instructions. Recommend rest

, heat, and analgesics. Instructed to return to ER if symptoms worsen or new 

symptoms arise.





Disposition


Counseled Patient/Family Regarding: Diagnosis, Need For Followup, Rx Given





- Disposition


Referrals: 


[facility] [Outside]


[facility]


Disposition: HOME/ ROUTINE


Disposition Time: 14:15


Condition: STABLE


Additional Instructions: 


Apply heat to area 15 minutes three times a day. Take Motrin as needed for pain 

every 6 hours, with food to not upset stomach. Take Flexeril for muscle pain 

and spasm, caution can cause drowsiness. Follow up with clinic if pain persists 

over one week





Aplique calor al jl 15 minutos agustin veces al da. [place] Motrin cuando sea 

necesario para el dolor cada 6 horas, con alimentos para no alterar el est

ramesh. [place] Flexeril para el dolor y el espasmo muscular, la precaucin puede 

causar somnolencia. Seguimiento con clinica si el dolor persiste theo torsten 

semana


Prescriptions: 


Cyclobenzaprine [Cyclobenzaprine HCl] 10 mg PO TID #30 tab


Ibuprofen [Motrin] 600 mg PO Q8 #30 tab


Instructions:  Low Back Pain  (DC)


Print Language: Romanian





- POA


Present On Arrival: None





- Clinical Impression


Clinical Impression: 


 Low back pain








- PA / NP / Resident Statement


MD/DO has reviewed & agrees with the documentation as recorded.





- Scribe Statement


The provider has reviewed the documentation as recorded by the Scribe (Lesa Rodriguez)








All medical record entries made by the Scribe were at my direction and 

personally dictated by me. I have reviewed the chart and agree that the record 

accurately reflects my personal performance of the history, physical exam, 

medical decision making, and the department course for this patient. I have 

also personally directed, reviewed, and agree with the discharge instructions 

and disposition.

## 2018-12-07 ENCOUNTER — HOSPITAL ENCOUNTER (EMERGENCY)
Dept: HOSPITAL 31 - C.ER | Age: 33
Discharge: HOME | End: 2018-12-07
Payer: MEDICAID

## 2018-12-07 VITALS — OXYGEN SATURATION: 99 %

## 2018-12-07 VITALS — HEART RATE: 115 BPM | SYSTOLIC BLOOD PRESSURE: 133 MMHG | TEMPERATURE: 98.5 F | DIASTOLIC BLOOD PRESSURE: 77 MMHG

## 2018-12-07 VITALS — RESPIRATION RATE: 16 BRPM

## 2018-12-07 VITALS — BODY MASS INDEX: 30.2 KG/M2

## 2018-12-07 DIAGNOSIS — O02.0: Primary | ICD-10-CM

## 2018-12-07 LAB
ALBUMIN SERPL-MCNC: 4.5 G/DL (ref 3.5–5)
ALBUMIN/GLOB SERPL: 1.5 {RATIO} (ref 1–2.1)
ALT SERPL-CCNC: 18 U/L (ref 9–52)
AST SERPL-CCNC: 18 U/L (ref 14–36)
BASOPHILS # BLD AUTO: 0.1 K/UL (ref 0–0.2)
BASOPHILS NFR BLD: 0.7 % (ref 0–2)
BILIRUB UR-MCNC: NEGATIVE MG/DL
BUN SERPL-MCNC: 10 MG/DL (ref 7–17)
CALCIUM SERPL-MCNC: 9.3 MG/DL (ref 8.6–10.4)
EOSINOPHIL # BLD AUTO: 0 K/UL (ref 0–0.7)
EOSINOPHIL NFR BLD: 0.1 % (ref 0–4)
ERYTHROCYTE [DISTWIDTH] IN BLOOD BY AUTOMATED COUNT: 16.5 % (ref 11.5–14.5)
GFR NON-AFRICAN AMERICAN: > 60
GLUCOSE UR STRIP-MCNC: NORMAL MG/DL
HGB BLD-MCNC: 12.1 G/DL (ref 11–16)
LEUKOCYTE ESTERASE UR-ACNC: (no result) LEU/UL
LIPASE: 32 U/L (ref 23–300)
LYMPHOCYTES # BLD AUTO: 2.1 K/UL (ref 1–4.3)
LYMPHOCYTES NFR BLD AUTO: 15.5 % (ref 20–40)
MCH RBC QN AUTO: 26.2 PG (ref 27–31)
MCHC RBC AUTO-ENTMCNC: 32.6 G/DL (ref 33–37)
MCV RBC AUTO: 80.3 FL (ref 81–99)
MONOCYTES # BLD: 0.5 K/UL (ref 0–0.8)
MONOCYTES NFR BLD: 3.5 % (ref 0–10)
NEUTROPHILS # BLD: 11 K/UL (ref 1.8–7)
NEUTROPHILS NFR BLD AUTO: 80.2 % (ref 50–75)
NRBC BLD AUTO-RTO: 0 % (ref 0–2)
PH UR STRIP: 5 [PH] (ref 5–8)
PLATELET # BLD: 334 K/UL (ref 130–400)
PMV BLD AUTO: 8.1 FL (ref 7.2–11.7)
PROT UR STRIP-MCNC: NEGATIVE MG/DL
RBC # BLD AUTO: 4.62 MIL/UL (ref 3.8–5.2)
RBC # UR STRIP: NEGATIVE /UL
SP GR UR STRIP: 1.01 (ref 1–1.03)
SQUAMOUS EPITHIAL: 2 /HPF (ref 0–5)
UROBILINOGEN UR-MCNC: NORMAL MG/DL (ref 0.2–1)
WBC # BLD AUTO: 13.7 K/UL (ref 4.8–10.8)

## 2018-12-07 NOTE — C.PDOC
History Of Present Illness


32 y/o female presents to the ED complaining of lower abdominal pain for 5 days.

Associated with non-bloody non-bilious vomiting, up to 5 episodes per day. She 

denies prior episodes of similar pain. Patient also denies any hematemesis, 

diarrhea, hematuria, dysuria, or urinary frequency. LMP was in 2018.





Time Seen by Provider: 18 17:31


Chief Complaint (Nursing): Female Genitourinary


History Per: Patient


History/Exam Limitations: no limitations


Onset/Duration Of Symptoms: Days


Current Symptoms Are (Timing): Still Present


Location Of Pain/Discomfort: Suprapubic


Quality Of Discomfort: Cramping


Associated Symptoms: Nausea, Vomiting


Abnormal Vaginal Bleeding: No


Last Menstral Period: Oct 2018





Past Medical History


Reviewed: Historical Data, Nursing Documentation, Vital Signs


Vital Signs: 





                                Last Vital Signs











Temp  99.3 F   18 17:22


 


Pulse  113 H  18 17:22


 


Resp  16   18 17:22


 


BP  127/78   18 17:22


 


Pulse Ox  99   18 17:22














- Medical History


PMH: HTN, Kidney Stones, Chronic Kidney Disease


Surgical History:  (x2)





- [facility] Procedures











INCIS VULVA/PERINEUM NEC (14)


INCISE BARTHOLIN'S GLAND (05/27/15)








Family History: States: Unknown Family Hx





- Social History


Hx Tobacco Use: No


Hx Alcohol Use: No


Hx Substance Use: No





- Immunization History


Hx Tetanus Toxoid Vaccination: Yes


Hx Influenza Vaccination: Yes


Hx Pneumococcal Vaccination: No





Review Of Systems


Except As Marked, All Systems Reviewed And Found Negative.


Constitutional: Negative for: Fever, Chills


Gastrointestinal: Positive for: Vomiting, Abdominal Pain (lower).  Negative for:

Diarrhea, Hematochezia, Hematemesis


Genitourinary: Negative for: Dysuria, Frequency, Hematuria, Vaginal Bleeding


Neurological: Negative for: Weakness, Dizziness





Physical Exam





- Physical Exam


Additional Physical Exam Comments: 


Constitutional: No acute distress.


Head: Normocephalic. Atraumatic.


Eyes: PERRL.


ENT: Moist mucous membranes.


Neck: Supple.


Cardiovascular: Tachycardic. Radial pulse 2+ bilaterally.


Chest: No tenderness.


Respiratory: Clear to auscultation bilaterally.


GI: Soft. Nondistended. Lower abdominal tenderness, mostly suprapubic, with guar

ding.


Back: No CVA tenderness.


Musculoskeletal: No tenderness or swelling of extremities.


Skin: No rash.


Neurologic: Alert, no focal deficit.








ED Course And Treatment





- Laboratory Results


Result Diagrams: 


                                 18 17:57





                                 18 17:57


O2 Sat by Pulse Oximetry: 99 (RA)


Pulse Ox Interpretation: Normal





Medical Decision Making


Medical Decision Making: 


Impression: Suprapubic pain, vomiting





Plan:


--CMP


--Beta-HCG


--Lipase


--CBC


--Blood type/screen


--Urine HCG


--Urinalysis


--Urine culture





US- Pregnancy (1st Trimester)





Clinical history: Pain.  








Findings: Real-time transabdominal and transvaginal ultrasound images of the 

pelvis were obtained.  An anteverted uterus is noted, measuring 12.4 x 5.7 x 7.8

cm.  The uterus demonstrates normal echotexture and echogenicity. There is an 

echogenic mass in the posterior mid- uterus measuring 2.1 x 2.2 x 2.3 cm. A 

second echogenic lesion is seen in the posterior mid-uterus, measuring 1.1 x 1.0

.1 cm. The endometrial canal demonstrates a intrauterine gestational sac with a 

mean sac diameter 0.51 cm. No yolk sac or fetal pole is seen at this time. There

is an adjacent complex fluid collection measuring 2.0 x 0.5 x 1.9 cm.  The right

ovary measures 4.1 x 3.3 x 3.8 cm. There is a simple cyst in the right ovary 

measuring 3.3 x 2.5 x 3.2 cm. There is a rounded complex lesion in the right 

ovary measuring 2.0 x 1.9 x 1.9 cm.  The left ovary measures 3.1 x 3.0 x 2.8 cm.

 No adnexal masses are seen.  Color Doppler flow is seen within both ovaries.  

There is a small on of free fluid adjacent to the right ovary. 








Impression:  


1. Suspected small intrauterine gestational sac, too early for ultrasound 

dating. No evidence of a fetal pole or yolk sac is demonstrated at this time. 

Follow-up with serial serum beta hCG levels is recommended for further 

evaluation. 


2. Small adjacent subchorionic hemorrhage. 


3. Hemorrhagic right ovarian corpus luteum cyst. Simple right ovarian cyst also 

noted. 


4. Small amount of free fluid in the right adnexa. 


5. Leiomyomatous uterus.





Discussed case with OBGYN on call Dr. Browning who recommends repeat beta in 2

days to trend. Likely failed pregnancy but given last menstrual period and beta 

of 3000, US finding noncongruent. Otherwise, can be discharged now, instructed 

to return to ED for worsening pain, vomiting, fever, dyspnea, or any other 

problem. 





Disposition





- Disposition


Disposition: HOME/ ROUTINE


Disposition Time: 21:54


Condition: STABLE


Instructions:  Miscarriage


Forms:  Work/School/Gym Excuse, [facility] (English)





- Clinical Impression


Clinical Impression: 


 Blighted ovum








- Scribe Statement


The provider has reviewed the documentation as recorded by the Nimisha Mclean





Provider Attestation: 


All medical record entries made by the Nimisha were at my direction and 

personally dictated by me. I have reviewed the chart and agree that the record 

accurately reflects my personal performance of the history, physical exam, 

medical decision making, and the department course for this patient. I have also

personally directed, reviewed, and agree with the discharge instructions and 

disposition.

## 2018-12-09 ENCOUNTER — HOSPITAL ENCOUNTER (EMERGENCY)
Dept: HOSPITAL 31 - C.ER | Age: 33
Discharge: HOME | End: 2018-12-09
Payer: COMMERCIAL

## 2018-12-09 VITALS — RESPIRATION RATE: 18 BRPM

## 2018-12-09 VITALS
SYSTOLIC BLOOD PRESSURE: 106 MMHG | OXYGEN SATURATION: 100 % | TEMPERATURE: 97.7 F | HEART RATE: 103 BPM | DIASTOLIC BLOOD PRESSURE: 63 MMHG

## 2018-12-09 VITALS — BODY MASS INDEX: 30.2 KG/M2

## 2018-12-09 DIAGNOSIS — O26.891: Primary | ICD-10-CM

## 2018-12-09 DIAGNOSIS — R10.9: ICD-10-CM

## 2018-12-09 DIAGNOSIS — Z3A.00: ICD-10-CM

## 2018-12-09 NOTE — C.PDOC
History Of Present Illness


33 year old female presents to the ED for a reevaluation and bloodwork. Patient 

was seen on  for abdominal pain and positive pregnancy test. Patient was 

advised to return today for bloodwork. Denies any vaginal bleeding, nausea, 

vomiting, diarrhea, fever, chills, hematuria. [facility] 10/27. 


Time Seen by Provider: 18 13:36


Chief Complaint (Nursing): Abdominal Pain


History Per: Patient


History/Exam Limitations: no limitations


Onset/Duration Of Symptoms: Days


Current Symptoms Are (Timing): Still Present


Radiation Of Pain To:: None


Associated Symptoms: denies: Fever, Chills, Nausea, Vomiting, Diarrhea, Urinary 

Symptoms


Last Menstral Period: 10/27/18





Past Medical History


Reviewed: Historical Data, Nursing Documentation, Vital Signs


Vital Signs: 





                                Last Vital Signs











Temp  97.7 F   18 13:38


 


Pulse  103 H  18 13:38


 


Resp  20   18 13:38


 


BP  106/63   18 13:38


 


Pulse Ox  100   18 13:38














- Medical History


PMH: HTN, Kidney Stones, Chronic Kidney Disease


Surgical History:  (x2)





- [facility] Procedures











INCIS VULVA/PERINEUM NEC (14)


INCISE BARTHOLIN'S GLAND (05/27/15)








Family History: States: No Known Family Hx





- Social History


Hx Tobacco Use: No


Hx Alcohol Use: No


Hx Substance Use: No





- Immunization History


Hx Tetanus Toxoid Vaccination: No


Hx Influenza Vaccination: No


Hx Pneumococcal Vaccination: No





Review Of Systems


Constitutional: Negative for: Fever, Chills


Gastrointestinal: Negative for: Nausea, Vomiting, Diarrhea


Genitourinary: Negative for: Dysuria, Hematuria, Vaginal Discharge, Vaginal 

Bleeding





Physical Exam





- Physical Exam


Appears: Non-toxic, No Acute Distress


Skin: Warm, Dry, No Rash


Head: Atraumatic, Normacephalic


Eye(s): bilateral: Normal Inspection


Nose: Normal


Oral Mucosa: Moist


Neck: Supple


Chest: Symmetrical


Cardiovascular: Rhythm Regular


Respiratory: No Rales, No Rhonchi, No Wheezing


Gastrointestinal/Abdominal: Soft, No Tenderness, No Distention, No Guarding


Extremity: Bilateral: Atraumatic, Normal Color And Temperature, Normal ROM


Neurological/Psych: Oriented x3, Normal Speech


Gait: Steady





ED Course And Treatment


O2 Sat by Pulse Oximetry: 100 (RA)


Pulse Ox Interpretation: Normal





Medical Decision Making


Medical Decision Making: 





Plan


- Bloodwork





Prior records from  reviewed. Ultrasound shows small intrauterine 

gestational sac, too early for ultrasound dating. No evidence of fetal pole or 

yolk sac. 


BHCG today was 5253 trending upwards. 


Upon further discussion with patient she states she did have light spotting 

second week of November and unsure if that was last menses. She remained well in

no distress. She was seated comfortably. I discussed the plan for discharge and 

to follow up with OB/gyn clinic for further care and will need to repeat US in 

one week to confirm viable pregnancy





Disposition


Counseled Patient/Family Regarding: Diagnosis, Need For Followup





- Disposition


[facility]errals[facility] 


[facility]


Disposition: HOME/ ROUTINE


Disposition Time: 15:18


Condition: STABLE


Additional Instructions: 


Usted fue evaluado hoy para laboratorios de repeticin en el embarazo


HCG fue de 5000


Елена un seguimiento con schroeder mdico o clnica en torsten semana para repetir el 

anlisis de venessa y la ecografa


Instructions:  Pregnancy - The First Month


Forms:  [employer] (English)


Print Language: Kazakh





- POA


Present On Arrival: None





- Clinical Impression


Clinical Impression: 


 Early stage of pregnancy








- PA / NP / Resident Statement


MD/DO has reviewed & agrees with the documentation as recorded.





- Scribe Statement


The provider has reviewed the documentation as recorded by the Scribe


Mary Padilla








All medical record entries made by the Scribe were at my direction and 

personally dictated by me. I have reviewed the chart and agree that the record 

accurately reflects my personal performance of the history, physical exam, 

medical decision making, and the department course for this patient. I have also

 personally directed, reviewed, and agree with the discharge instructions and 

disposition.

## 2019-01-02 ENCOUNTER — HOSPITAL ENCOUNTER (EMERGENCY)
Dept: HOSPITAL 31 - C.ER | Age: 34
Discharge: LEFT BEFORE DISCHARGE | End: 2019-01-02
Payer: MEDICAID

## 2019-01-02 VITALS — BODY MASS INDEX: 30.2 KG/M2

## 2019-01-02 DIAGNOSIS — Z02.89: Primary | ICD-10-CM

## 2019-01-02 DIAGNOSIS — R10.9: ICD-10-CM

## 2019-01-15 ENCOUNTER — HOSPITAL ENCOUNTER (EMERGENCY)
Dept: HOSPITAL 31 - C.ER | Age: 34
Discharge: HOME | End: 2019-01-15
Payer: MEDICAID

## 2019-01-15 VITALS
SYSTOLIC BLOOD PRESSURE: 126 MMHG | HEART RATE: 82 BPM | TEMPERATURE: 98 F | OXYGEN SATURATION: 100 % | DIASTOLIC BLOOD PRESSURE: 76 MMHG | RESPIRATION RATE: 20 BRPM

## 2019-01-15 VITALS — BODY MASS INDEX: 30.2 KG/M2

## 2019-01-15 DIAGNOSIS — B34.9: ICD-10-CM

## 2019-01-15 DIAGNOSIS — O98.511: Primary | ICD-10-CM

## 2019-01-15 DIAGNOSIS — Z3A.11: ICD-10-CM

## 2019-01-15 LAB
ALBUMIN SERPL-MCNC: 3.7 G/DL (ref 3.5–5)
ALBUMIN/GLOB SERPL: 1.4 {RATIO} (ref 1–2.1)
ALT SERPL-CCNC: 21 U/L (ref 9–52)
AST SERPL-CCNC: 11 U/L (ref 14–36)
BACTERIA #/AREA URNS HPF: (no result) /[HPF]
BASOPHILS # BLD AUTO: 0.1 K/UL (ref 0–0.2)
BASOPHILS NFR BLD: 0.6 % (ref 0–2)
BILIRUB UR-MCNC: NEGATIVE MG/DL
BUN SERPL-MCNC: 7 MG/DL (ref 7–17)
CALCIUM SERPL-MCNC: 8.5 MG/DL (ref 8.6–10.4)
EOSINOPHIL # BLD AUTO: 0.1 K/UL (ref 0–0.7)
EOSINOPHIL NFR BLD: 1.2 % (ref 0–4)
ERYTHROCYTE [DISTWIDTH] IN BLOOD BY AUTOMATED COUNT: 17.4 % (ref 11.5–14.5)
GFR NON-AFRICAN AMERICAN: > 60
GLUCOSE UR STRIP-MCNC: (no result) MG/DL
HGB BLD-MCNC: 11 G/DL (ref 11–16)
LEUKOCYTE ESTERASE UR-ACNC: (no result) LEU/UL
LYMPHOCYTES # BLD AUTO: 2 K/UL (ref 1–4.3)
LYMPHOCYTES NFR BLD AUTO: 22.2 % (ref 20–40)
MCH RBC QN AUTO: 27.2 PG (ref 27–31)
MCHC RBC AUTO-ENTMCNC: 32.5 G/DL (ref 33–37)
MCV RBC AUTO: 83.9 FL (ref 81–99)
MONOCYTES # BLD: 0.7 K/UL (ref 0–0.8)
MONOCYTES NFR BLD: 7.8 % (ref 0–10)
NEUTROPHILS # BLD: 6 K/UL (ref 1.8–7)
NEUTROPHILS NFR BLD AUTO: 68.2 % (ref 50–75)
NRBC BLD AUTO-RTO: 0 % (ref 0–2)
PH UR STRIP: 7 [PH] (ref 5–8)
PLATELET # BLD: 224 K/UL (ref 130–400)
PMV BLD AUTO: 8.3 FL (ref 7.2–11.7)
PROT UR STRIP-MCNC: NEGATIVE MG/DL
RBC # BLD AUTO: 4.04 MIL/UL (ref 3.8–5.2)
RBC # UR STRIP: NEGATIVE /UL
SP GR UR STRIP: 1.01 (ref 1–1.03)
SQUAMOUS EPITHIAL: 6 /HPF (ref 0–5)
URINE AMORPHOUS SEDIMENT: (no result) /UL
UROBILINOGEN UR-MCNC: NORMAL MG/DL (ref 0.2–1)
WBC # BLD AUTO: 8.8 K/UL (ref 4.8–10.8)

## 2019-01-15 PROCEDURE — 96360 HYDRATION IV INFUSION INIT: CPT

## 2019-01-15 PROCEDURE — 87804 INFLUENZA ASSAY W/OPTIC: CPT

## 2019-01-15 PROCEDURE — 80053 COMPREHEN METABOLIC PANEL: CPT

## 2019-01-15 PROCEDURE — 86900 BLOOD TYPING SEROLOGIC ABO: CPT

## 2019-01-15 PROCEDURE — 85025 COMPLETE CBC W/AUTO DIFF WBC: CPT

## 2019-01-15 PROCEDURE — 99284 EMERGENCY DEPT VISIT MOD MDM: CPT

## 2019-01-15 PROCEDURE — 81001 URINALYSIS AUTO W/SCOPE: CPT

## 2019-01-15 PROCEDURE — 86850 RBC ANTIBODY SCREEN: CPT

## 2019-01-15 PROCEDURE — 84702 CHORIONIC GONADOTROPIN TEST: CPT

## 2019-01-15 PROCEDURE — 76815 OB US LIMITED FETUS(S): CPT

## 2019-01-15 NOTE — US
Indication: pelvic pain, pregnancy



Comparison: Pelvic ultrasound performed 12/21/18 



Technique: Transabdominal pelvic ultrasound 



Findings: 



Uterus measures approximately 16.5 x 9.0 x 8.6 cm.  Anteverted.  

Posterior uterine heterogeneous mass with associated calcifications 

consistent with fibroid measures 2.9 x 1.9 x 2.8 cm.  Cervix length 

measures approximately 4.3 cm. 



There is a single intrauterine fetus present.  4 mm yolk sac.  The 

gestational sac measures 5.2 cm and is compatible with a gestational 

age of 11 weeks 0 days.  The crown-rump length measures 4.5 cm and is 

compatible with a gestational age of 11 weeks 2 days. 



2.4 x 1.0 x 3.5 cm subchorionic hemorrhage. 



There is fetal heart motion which measured 129.2 BPM. 



The right ovary measures 6.3 x 2.4 x 5.4 cm and 3.3 x 2.5 x 2.9 cm 

cyst.  The left ovary measures 3.6 x 2.2 x 2.5 cm.  Flow was 

demonstrated to both ovaries. 



Impression: 



Live single intrauterine pregnancy with estimated gestational age 11 

weeks 2 days by crown-rump length calculation.  Fetal heart rate 

129.2 bpm. 



2.4 x 1.0 x 3.5 cm subchorionic hemorrhage. 



Advise an anomaly screen at 16-18 weeks gestational age. 



Posterior uterine fibroid as above.

## 2019-01-15 NOTE — C.PDOC
History Of Present Illness


33 year old female presents to the ED complaining of 3 days of generalized 

bodyaches, non-productive cough, nausea, vomiting, sore throat, and pelvic pain.

Also states she had an episode of vaginal spotting 4 days ago which then reso

lved. Patient is  and reports she is currently 8 weeks pregnant by sonogram.

Denies any diarrhea, dysuria, hematuria, chest pain, SOB, or other associated 

symptoms.





Time Seen by Provider: 01/15/19 14:37


Chief Complaint (Nursing): Abdominal Pain


History Per: Patient


History/Exam Limitations: no limitations


Onset/Duration Of Symptoms: Days (x3)


Current Symptoms Are (Timing): Still Present


Location Of Pain/Discomfort: Suprapubic


: 3


Para: 2





Past Medical History


Reviewed: Historical Data, Nursing Documentation, Vital Signs


Vital Signs: 





                                Last Vital Signs











Temp  98 F   01/15/19 14:17


 


Pulse  82   01/15/19 14:17


 


Resp  20   01/15/19 14:17


 


BP  126/76   01/15/19 14:17


 


Pulse Ox  100   01/15/19 14:17














- Medical History


PMH: HTN (not on meds), Kidney Stones, Chronic Kidney Disease


Surgical History:  (x2)





- [facility] Procedures











INCIS VULVA/PERINEUM NEC (14)


INCISE BARTHOLIN'S GLAND (05/27/15)








Family History: States: Unknown Family Hx





- Social History


Hx Tobacco Use: No


Hx Alcohol Use: No


Hx Substance Use: No





- Immunization History


Hx Tetanus Toxoid Vaccination: No


Hx Influenza Vaccination: No


Hx Pneumococcal Vaccination: No





Review Of Systems


Constitutional: Positive for: Other (Generalized bodyaches).  Negative for: 

Fever


ENT: Positive for: Throat Pain


Cardiovascular: Negative for: Chest Pain


Respiratory: Positive for: Cough.  Negative for: Shortness of Breath, Sputum


Gastrointestinal: Positive for: Nausea, Vomiting


Genitourinary: Positive for: Vaginal Bleeding (spotting, resolved), Pelvic Pain.

 Negative for: Dysuria, Frequency, Hematuria


Neurological: Negative for: Weakness, Headache





Physical Exam





- Physical Exam


Appears: Non-toxic, No Acute Distress


Skin: Warm, Dry, No Rash


Head: Atraumatic, Normacephalic


Eye(s): bilateral: Normal Inspection, PERRL, EOMI


Nose: Normal


Oral Mucosa: Moist


Neck: Normal ROM, Supple


Chest: Symmetrical


Cardiovascular: Rhythm Regular, No Murmur


Respiratory: Normal Breath Sounds, No Rales, No Rhonchi, No Wheezing


Gastrointestinal/Abdominal: Soft, Tenderness (suprapubic), No Guarding, No Rebou

nd


Back: No CVA Tenderness


Extremity: Bilateral: Atraumatic, Normal Color And Temperature, Normal ROM


Pulses: Left Radial: Normal, Right Radial: Normal


Neurological/Psych: Oriented x3





ED Course And Treatment





- Laboratory Results


Result Diagrams: 


                                 01/15/19 15:23





                                 01/15/19 15:23


O2 Sat by Pulse Oximetry: 100 (RA)


Pulse Ox Interpretation: Normal


Progress Note: Initiated work-up with labs and OB ultrasound, added on flu swab.

Administered NS IVF x 1 bolus.





Disposition


Counseled Patient/Family Regarding: Studies Performed, Diagnosis, Need For 

Followup





- Disposition


Referrals: 


Elio Feliz MD [Staff Provider] - 


Disposition: HOME/ ROUTINE


Disposition Time: 17:25


Condition: STABLE


Additional Instructions: 


FOLLOW UP WITH YOUR DOCTOR IN 1-2 DAYS





DRINK PLENTY OF FLUIDS, USE TYLENOL AS NEEDED FOR PAIN/FEVER





RETURN TO EMERGENCY ROOM IF YOUR SYMPTOMS WORSEN








SEGUIR CON WARREN MDICO EN 1-2 SWEET





ANTIONETTE MUCHOS FLUIDOS, USE TYLENOL ONEIDA SE NECESITA PARA EL DOLOR / FIEBRE





VUELVA A LA ROBERTO DE EMERGENCIA SI LUPIS SNTOMAS SE DELVALLE PROBLEMAS


Prescriptions: 


Acetaminophen [Tylenol 325mg tab] 650 mg PO Q6 PRN #30 tab


 PRN Reason: pain/fever


Instructions:  Viral Syndrome (DC)


Forms:  [employer] (English)


Print Language: Swiss





- Clinical Impression


Clinical Impression: 


 Pregnancy, Viral syndrome








- Scribe Statement


The provider has reviewed the documentation as recorded by the Nimisha Mclean





Provider Attestation: 


All medical record entries made by the Scribe were at my direction and 

personally dictated by me. I have reviewed the chart and agree that the record 

accurately reflects my personal performance of the history, physical exam, 

medical decision making, and the department course for this patient. I have also

personally directed, reviewed, and agree with the discharge instructions and 

disposition.